# Patient Record
Sex: FEMALE | URBAN - METROPOLITAN AREA
[De-identification: names, ages, dates, MRNs, and addresses within clinical notes are randomized per-mention and may not be internally consistent; named-entity substitution may affect disease eponyms.]

---

## 2018-12-06 ENCOUNTER — IMPORTED ENCOUNTER (OUTPATIENT)
Dept: URBAN - METROPOLITAN AREA CLINIC 31 | Facility: CLINIC | Age: 63
End: 2018-12-06

## 2018-12-06 PROBLEM — H25.813: Noted: 2018-12-06

## 2018-12-06 PROBLEM — H04.123: Noted: 2018-12-06

## 2018-12-06 PROCEDURE — 92004 COMPRE OPH EXAM NEW PT 1/>: CPT

## 2018-12-06 PROCEDURE — 92015 DETERMINE REFRACTIVE STATE: CPT

## 2018-12-06 NOTE — PATIENT DISCUSSION
1.  Dry Eye OU:    Pt has been using Similasen only when needed and waits till eyes bother her. Expl she need to use on regular basis like drinking water for comfort. Pt on screens alot. Use PF tears retaine or Homeopathic PF. At least 2x per day but better for 4x. Zaditor if itching. 2.  Early Cataract OU: Explained how cataracts can effect vision. Recommend clinical observation. The patient was advised to contact us if any change or worsening of vision. 3. Pt used to be myopic--dist getting better but needs readers now. Using OTC 1.50-2.00. Feels dist good. Has slight dist rx. 4.   RTN 1 yr CE

## 2019-12-13 ENCOUNTER — IMPORTED ENCOUNTER (OUTPATIENT)
Dept: URBAN - METROPOLITAN AREA CLINIC 31 | Facility: CLINIC | Age: 64
End: 2019-12-13

## 2019-12-13 PROBLEM — H25.813: Noted: 2019-12-13

## 2019-12-13 PROBLEM — H04.123: Noted: 2019-12-13

## 2019-12-13 PROCEDURE — 92014 COMPRE OPH EXAM EST PT 1/>: CPT

## 2019-12-13 PROCEDURE — 92015 DETERMINE REFRACTIVE STATE: CPT

## 2019-12-13 NOTE — PATIENT DISCUSSION
1.  Dry Eye OU:    Pt has been using Similasen only when needed and waits till eyes bother her. Expl she need to use on regular basis like drinking water for comfort. Pt on screens alot. Use PF tears retaine or Homeopathic PF. At least 2x per day but better for 4x. Zaditor if itching. Lumify for redness prn. 2.  Early Cataract OU: Explained how cataracts can effect vision. Recommend clinical observation. The patient was advised to contact us if any change or worsening of vision. 3. Pt used to be myopic--dist getting better but needs readers now. Using OTC 1.50-2.00. Feels dist good. Has slight dist rx. 4.   RTN 1 yr CE

## 2020-12-15 ENCOUNTER — IMPORTED ENCOUNTER (OUTPATIENT)
Dept: URBAN - METROPOLITAN AREA CLINIC 31 | Facility: CLINIC | Age: 65
End: 2020-12-15

## 2020-12-15 PROBLEM — H25.813: Noted: 2020-12-15

## 2020-12-15 PROBLEM — H04.123: Noted: 2020-12-15

## 2020-12-15 PROCEDURE — 92014 COMPRE OPH EXAM EST PT 1/>: CPT

## 2020-12-15 PROCEDURE — 92015 DETERMINE REFRACTIVE STATE: CPT

## 2021-12-07 ENCOUNTER — IMPORTED ENCOUNTER (OUTPATIENT)
Dept: URBAN - METROPOLITAN AREA CLINIC 31 | Facility: CLINIC | Age: 66
End: 2021-12-07

## 2021-12-07 PROBLEM — H04.123: Noted: 2021-12-07

## 2021-12-07 PROBLEM — H25.813: Noted: 2021-12-07

## 2021-12-07 PROBLEM — H16.143: Noted: 2021-12-07

## 2021-12-07 PROBLEM — M12.9: Noted: 2021-12-07

## 2021-12-07 PROCEDURE — 99214 OFFICE O/P EST MOD 30 MIN: CPT

## 2021-12-07 PROCEDURE — 92015 DETERMINE REFRACTIVE STATE: CPT

## 2021-12-07 NOTE — PATIENT DISCUSSION
1.  SPK OU  --  Start using PF trears qid and gels/lei qhs. May need Restasis2. Hashimotos--  DRy mouth and dry skin--Pt thinks she may have sjogrens--  Continue to follow with PCP/Rheumatologist/Neurologist.3. Dry Eye OU:    Pt has been using Similasen only when needed and waits till eyes bother her. Expl she need to use on regular basis like drinking water for comfort. Pt on screens alot. Use PF tears retaine or Homeopathic PF. At least 2x per day but better for 4x. Zaditor if itching. Lumify for redness prn. 3.  Early Cataract OU: Explained how cataracts can effect vision. Recommend clinical observation. The patient was advised to contact us if any change or worsening of vision. 4. Pt used to be myopic--dist getting better but needs readers now. Using OTC 1.50-2.00. Feels dist good. Has slight dist rx. Wants Blue light lenses5.   RTN 1 yr CE

## 2022-04-02 ASSESSMENT — TONOMETRY
OS_IOP_MMHG: 12
OS_IOP_MMHG: 12
OD_IOP_MMHG: 12
OD_IOP_MMHG: 12
OS_IOP_MMHG: 13
OS_IOP_MMHG: 12
OD_IOP_MMHG: 13
OD_IOP_MMHG: 12

## 2022-04-02 ASSESSMENT — VISUAL ACUITY
OS_SC: 20/40-2
OS_CC: 20/40-1
OS_CC: 20/30
OD_SC: 20/50+2
OS_SC: 20/200
OS_CC: 20/40
OD_CC: 20/30+1
OS_SC: 20/100
OD_CC: 20/30
OS_CC: 20/25
OD_SC: 20/200
OD_CC: 20/30
OD_SC: 20/100
OD_CC: 20/40+2

## 2022-12-06 ENCOUNTER — COMPREHENSIVE EXAM (OUTPATIENT)
Dept: URBAN - METROPOLITAN AREA CLINIC 34 | Facility: CLINIC | Age: 67
End: 2022-12-06

## 2022-12-06 PROCEDURE — 92014 COMPRE OPH EXAM EST PT 1/>: CPT

## 2022-12-06 PROCEDURE — 92015 DETERMINE REFRACTIVE STATE: CPT

## 2022-12-06 ASSESSMENT — TONOMETRY
OD_IOP_MMHG: 12
OS_IOP_MMHG: 12

## 2022-12-06 ASSESSMENT — VISUAL ACUITY
OD_SC: 20/30
OS_SC: 20/30

## 2022-12-06 NOTE — PATIENT DISCUSSION
Pt has been using Similasen only when needed and waits till eyes bother her. Expl she need to use on regular basis like drinking water for comfort. Pt on screens alot. Use PF tears retaine or Homeopathic PF. At least 2x per day but better for 4x. Zaditor if itching. Lumify for redness prn.

## 2022-12-06 NOTE — PATIENT DISCUSSION
Pt used to be myopic--dist getting better but needs readers now. Using OTC 1.50-2.00. Feels dist good. Has slight dist rx--get blue light.

## 2022-12-06 NOTE — PATIENT DISCUSSION
Hashimotos-- DRy mouth and dry skin--Pt thinks she may have sjogrens-- Continue to follow with PCP/Rheumatologist/Neurologist.

## 2024-01-09 ENCOUNTER — COMPREHENSIVE EXAM (OUTPATIENT)
Dept: URBAN - METROPOLITAN AREA CLINIC 34 | Facility: CLINIC | Age: 69
End: 2024-01-09

## 2024-01-09 DIAGNOSIS — H04.123: ICD-10-CM

## 2024-01-09 DIAGNOSIS — H25.813: ICD-10-CM

## 2024-01-09 DIAGNOSIS — H16.143: ICD-10-CM

## 2024-01-09 PROCEDURE — 92014 COMPRE OPH EXAM EST PT 1/>: CPT

## 2024-01-09 PROCEDURE — 92015 DETERMINE REFRACTIVE STATE: CPT

## 2024-01-09 ASSESSMENT — VISUAL ACUITY
OS_SC: 20/30
OD_SC: 20/30
OS_SC: J16
OD_SC: J16

## 2024-01-09 ASSESSMENT — TONOMETRY
OD_IOP_MMHG: 12
OS_IOP_MMHG: 12

## 2025-02-07 ENCOUNTER — COMPREHENSIVE EXAM (OUTPATIENT)
Age: 70
End: 2025-02-07

## 2025-02-07 DIAGNOSIS — H52.4: ICD-10-CM

## 2025-02-07 DIAGNOSIS — H25.813: ICD-10-CM

## 2025-02-07 DIAGNOSIS — H04.123: ICD-10-CM

## 2025-02-07 DIAGNOSIS — H16.143: ICD-10-CM

## 2025-02-07 PROCEDURE — 92014 COMPRE OPH EXAM EST PT 1/>: CPT

## 2025-02-07 PROCEDURE — 92015 DETERMINE REFRACTIVE STATE: CPT
